# Patient Record
Sex: FEMALE | Race: OTHER | Employment: UNEMPLOYED | URBAN - METROPOLITAN AREA
[De-identification: names, ages, dates, MRNs, and addresses within clinical notes are randomized per-mention and may not be internally consistent; named-entity substitution may affect disease eponyms.]

---

## 2022-12-18 ENCOUNTER — HOSPITAL ENCOUNTER (EMERGENCY)
Age: 22
Discharge: HOME | End: 2022-12-18
Attending: EMERGENCY MEDICINE

## 2022-12-18 VITALS
HEART RATE: 76 BPM | TEMPERATURE: 98 F | HEIGHT: 61 IN | BODY MASS INDEX: 26.22 KG/M2 | WEIGHT: 138.89 LBS | DIASTOLIC BLOOD PRESSURE: 76 MMHG | SYSTOLIC BLOOD PRESSURE: 115 MMHG | RESPIRATION RATE: 16 BRPM | OXYGEN SATURATION: 99 %

## 2022-12-18 DIAGNOSIS — N64.52 BREAST DISCHARGE: Primary | ICD-10-CM

## 2022-12-18 LAB — HCG UR QL: NEGATIVE

## 2022-12-18 PROCEDURE — 81025 URINE PREGNANCY TEST: CPT

## 2022-12-18 PROCEDURE — 99283 EMERGENCY DEPT VISIT LOW MDM: CPT

## 2022-12-18 NOTE — DISCHARGE INSTRUCTIONS
Thank you! Thank you for allowing me to care for you in the emergency department. It is my goal to provide you with excellent care. If you have not received excellent quality care, please ask to speak to the nurse manager. Please fill out the survey that will come to you by mail or email since we listen to your feedback! Below you will find a list of your tests from today's visit. Should you have any questions, please do not hesitate to call the emergency department. Labs  Recent Results (from the past 12 hour(s))   HCG URINE, QL    Collection Time: 12/18/22  2:14 PM   Result Value Ref Range    HCG urine, QL Negative Negative         Radiologic Studies  No orders to display     CT Results  (Last 48 hours)      None          CXR Results  (Last 48 hours)      None          ------------------------------------------------------------------------------------------------------------  The exam and treatment you received in the Emergency Department were for an urgent problem and are not intended as complete care. It is important that you follow-up with a doctor, nurse practitioner, or physician assistant to:  (1) confirm your diagnosis,  (2) re-evaluation of changes in your illness and treatment, and  (3) for ongoing care. Please take your discharge instructions with you when you go to your follow-up appointment. If you have any problem arranging a follow-up appointment, contact the Emergency Department. If your symptoms become worse or you do not improve as expected and you are unable to reach your health care provider, please return to the Emergency Department. We are available 24 hours a day. If a prescription has been provided, please have it filled as soon as possible to prevent a delay in treatment. If you have any questions or reservations about taking the medication due to side effects or interactions with other medications, please call your primary care provider or contact the ER.

## 2022-12-18 NOTE — ED PROVIDER NOTES
EMERGENCY DEPARTMENT HISTORY AND PHYSICAL EXAM      Date: 12/18/2022  Patient Name: Elida Cantu    History of Presenting Illness     Chief Complaint   Patient presents with    Breast Discharge     History Provided By: Patient    HPI: Elida Cantu, 20 y.o. female with no medical problems who presents with bilateral breast discharge. She states that her symptoms started 2 days ago. She says that the discharge has been clear. She is having some mild bilateral nipple pain that is intermittent and nonradiating. Nothing seems to make the pain better or worse. She denies any other breast pain or symptoms. There are no other complaints, changes, or physical findings at this time. PCP: None        Past History   Past Medical History:  History reviewed. No pertinent past medical history. Past Surgical History:  History reviewed. No pertinent surgical history. Family History:  History reviewed. No pertinent family history. Social History:  Social History     Tobacco Use    Smoking status: Never    Smokeless tobacco: Never       Allergies:  No Known Allergies     Review of Systems   Review of Systems   Constitutional:  Negative for fever. HENT:  Negative for congestion. Eyes:  Negative for visual disturbance. Respiratory:  Negative for shortness of breath. Cardiovascular:  Negative for chest pain. Gastrointestinal:  Negative for abdominal pain. Genitourinary:  Negative for dysuria. Musculoskeletal:  Negative for arthralgias. Skin:  Negative for rash. Neurological:  Negative for headaches. OB: Positive for nipple discharge. Physical Exam   Constitutional: No acute distress. Well-nourished. Skin: No rash. ENT: No rhinorrhea. No cough. Head is normocephalic and atraumatic. Eye: No proptosis or conjunctival injections. Respiratory: No apparent respiratory distress. Gastrointestinal: Nondistended. Musculoskeletal: No obvious bony deformities. Psychiatric: Cooperative. Appropriate mood and affect. OB/GYN: Normal-appearing breasts. Normal-appearing nipples. On palpation of the breasts and nipples there are no masses or significant tenderness. There is no nipple discharge. There is no rash. Lab and Diagnostic Study Results   Labs -     Recent Results (from the past 12 hour(s))   HCG URINE, QL    Collection Time: 12/18/22  2:14 PM   Result Value Ref Range    HCG urine, QL Negative Negative         Radiologic Studies -   [unfilled]  CT Results  (Last 48 hours)      None          CXR Results  (Last 48 hours)      None            Medical Decision Making and ED Course   - I am the first and primary provider for this patient AND AM THE PRIMARY PROVIDER OF RECORD. I reviewed the vital signs, available nursing notes, past medical history, past surgical history, family history and social history. - Initial assessment performed. The patients presenting problems have been discussed, and the staff are in agreement with the care plan formulated and outlined with them. I have encouraged them to ask questions as they arise throughout their visit. Vital Signs-Reviewed the patient's vital signs. Patient Vitals for the past 12 hrs:   Temp Pulse Resp BP SpO2   12/18/22 1350 98 °F (36.7 °C) 76 16 115/76 99 %     MDM  Differential diagnosis: papilloma, duct ectasia, galactorrhea, breast cancer. I did use [employer]  throughout interview and exam.  I did have nurse chaperone during physical exam.  Examination is normal.  She is not currently having any nipple discharge. She has no palpable mass on breast exam.  Unclear etiology of nipple discharge however I see no signs of infection. I did mention to the patient that there is a 5 to 15% chance that she could have breast cancer however that it is more likely to be a less serious cause such as duct ectasia or papilloma.   I did give her information to follow-up with OB/GYN and she states she is going to be able to follow-up appropriately and does have someone with her at home who can help with [place]. Patient was discharged in good condition. Disposition     Disposition: Discharged    DISCHARGE PLAN:  1. There are no discharge medications for this patient. 2.   Follow-up Information       Follow up With Specialties Details Why Contact Info    Blanca Mccartney MD Obstetrics & Gynecology Schedule an appointment as soon as possible for a visit  This is an OB/GYN doctor [address] [facility]  [phone]            3. Return to ED if worse   4. There are no discharge medications for this patient. Diagnosis/Clinical Impression     Clinical Impression:   1. Breast discharge       Attestations:  Porfirio Branch, DO    Please note that this dictation was completed with [employer] the computer voice recognition software. Quite often unanticipated grammatical, syntax, homophones, and other interpretive errors are inadvertently transcribed by the computer software. Please disregard these errors. Please excuse any errors that have escaped final proofreading. Thank you.